# Patient Record
Sex: FEMALE | URBAN - METROPOLITAN AREA
[De-identification: names, ages, dates, MRNs, and addresses within clinical notes are randomized per-mention and may not be internally consistent; named-entity substitution may affect disease eponyms.]

---

## 2021-02-11 ENCOUNTER — OFFICE VISIT (OUTPATIENT)
Dept: URBAN - METROPOLITAN AREA CLINIC 88 | Facility: CLINIC | Age: 41
End: 2021-02-11
Payer: MEDICAID

## 2021-02-11 DIAGNOSIS — H43.393 OTHER VITREOUS OPACITIES, BILATERAL: ICD-10-CM

## 2021-02-11 DIAGNOSIS — E11.9 TYPE 2 DIABETES MELLITUS WITHOUT COMPLICATIONS: Primary | ICD-10-CM

## 2021-02-11 PROCEDURE — 92004 COMPRE OPH EXAM NEW PT 1/>: CPT | Performed by: OPHTHALMOLOGY

## 2021-02-11 ASSESSMENT — KERATOMETRY
OS: 43.00
OD: 42.25

## 2021-02-11 ASSESSMENT — INTRAOCULAR PRESSURE
OD: 17
OS: 17

## 2021-02-11 NOTE — IMPRESSION/PLAN
Impression: Other vitreous opacities, bilateral: H43.393. Condition: stable. Plan: Discussed signs and symptoms of PVD/floaters. Patient instructed to call the office immediately if any symptoms noted.

## 2021-02-11 NOTE — IMPRESSION/PLAN
Impression: Type 2 diabetes mellitus without complications: N08.4. Condition: stable. Plan: Diabetes type II: no background retinopathy, no signs of neovascularization noted. Discussed ocular and systemic benefits of blood sugar control.